# Patient Record
Sex: MALE | Race: OTHER | Employment: OTHER | URBAN - METROPOLITAN AREA
[De-identification: names, ages, dates, MRNs, and addresses within clinical notes are randomized per-mention and may not be internally consistent; named-entity substitution may affect disease eponyms.]

---

## 2021-05-12 ENCOUNTER — OFFICE VISIT (OUTPATIENT)
Dept: INTERNAL MEDICINE CLINIC | Facility: CLINIC | Age: 46
End: 2021-05-12
Payer: COMMERCIAL

## 2021-05-12 VITALS
TEMPERATURE: 99 F | DIASTOLIC BLOOD PRESSURE: 82 MMHG | HEIGHT: 69.5 IN | OXYGEN SATURATION: 98 % | HEART RATE: 73 BPM | BODY MASS INDEX: 29.3 KG/M2 | WEIGHT: 202.38 LBS | SYSTOLIC BLOOD PRESSURE: 130 MMHG

## 2021-05-12 DIAGNOSIS — R00.1 SINUS BRADYCARDIA: ICD-10-CM

## 2021-05-12 DIAGNOSIS — Z12.5 SCREENING PSA (PROSTATE SPECIFIC ANTIGEN): ICD-10-CM

## 2021-05-12 DIAGNOSIS — R79.89 LIVER FUNCTION TEST ABNORMALITY: ICD-10-CM

## 2021-05-12 DIAGNOSIS — R55 VASOVAGAL SYNCOPE: ICD-10-CM

## 2021-05-12 DIAGNOSIS — Z00.00 PHYSICAL EXAM, ANNUAL: Primary | ICD-10-CM

## 2021-05-12 DIAGNOSIS — I10 BENIGN HYPERTENSION: ICD-10-CM

## 2021-05-12 PROCEDURE — 99386 PREV VISIT NEW AGE 40-64: CPT | Performed by: INTERNAL MEDICINE

## 2021-05-12 PROCEDURE — 3079F DIAST BP 80-89 MM HG: CPT | Performed by: INTERNAL MEDICINE

## 2021-05-12 PROCEDURE — 3075F SYST BP GE 130 - 139MM HG: CPT | Performed by: INTERNAL MEDICINE

## 2021-05-12 PROCEDURE — 3008F BODY MASS INDEX DOCD: CPT | Performed by: INTERNAL MEDICINE

## 2021-05-12 NOTE — PROGRESS NOTES
Sarah Beth Morales is a 39year old male.     HPI:   Patient presents with:  Checkup: establish care      40 y/o M here to establish care; previous PCP Dr Jimbo Hernandez (now retired); has HTN that is treated with enalapril 20 mg daily, and hydrochlorothiazi loss; no fatigue  ENMT:  Negative for ear drainage, hearing loss and nasal drainage  Eyes:  Negative for eye discharge and vision loss  Cardiovascular:  Negative for chest pain; negative palpitations  Respiratory:  Negative for cough, dyspnea and wheezing [facility]iano in 2018; ceruloplasmin level was low at 18 in Sept 2018; liver US in Sept 2018 showed diffusely echogenic liver in appearance; ZAIDA titer 160; alpha 1- AT normal at 96.4; iron 77, iron sat 26%, ferritin 191; HCV neg; Hep B SAb was neg (non-i

## 2021-05-28 ENCOUNTER — LAB ENCOUNTER (OUTPATIENT)
Dept: LAB | Facility: HOSPITAL | Age: 46
End: 2021-05-28
Attending: INTERNAL MEDICINE
Payer: COMMERCIAL

## 2021-05-28 DIAGNOSIS — Z12.5 SCREENING PSA (PROSTATE SPECIFIC ANTIGEN): ICD-10-CM

## 2021-05-28 DIAGNOSIS — R79.89 LIVER FUNCTION TEST ABNORMALITY: ICD-10-CM

## 2021-05-28 PROCEDURE — 80061 LIPID PANEL: CPT | Performed by: INTERNAL MEDICINE

## 2021-05-28 PROCEDURE — 86706 HEP B SURFACE ANTIBODY: CPT

## 2021-05-28 PROCEDURE — 36415 COLL VENOUS BLD VENIPUNCTURE: CPT | Performed by: INTERNAL MEDICINE

## 2021-05-28 PROCEDURE — 84443 ASSAY THYROID STIM HORMONE: CPT | Performed by: INTERNAL MEDICINE

## 2021-05-28 PROCEDURE — 82390 ASSAY OF CERULOPLASMIN: CPT

## 2021-05-28 PROCEDURE — 82525 ASSAY OF COPPER: CPT

## 2021-05-28 PROCEDURE — 85025 COMPLETE CBC W/AUTO DIFF WBC: CPT | Performed by: INTERNAL MEDICINE

## 2021-05-28 PROCEDURE — 80053 COMPREHEN METABOLIC PANEL: CPT | Performed by: INTERNAL MEDICINE

## 2021-06-01 ENCOUNTER — TELEPHONE (OUTPATIENT)
Dept: INTERNAL MEDICINE CLINIC | Facility: CLINIC | Age: 46
End: 2021-06-01

## 2021-06-01 DIAGNOSIS — R79.89 LIVER FUNCTION TEST ABNORMALITY: Primary | ICD-10-CM

## 2021-06-01 DIAGNOSIS — E83.00 LOW CERULOPLASMIN LEVEL: ICD-10-CM

## 2021-06-01 NOTE — TELEPHONE ENCOUNTER
Low ceruloplasmin level 18.5  Hep B surface antibody level is nonreactive    Rec- 24-hour urine copper, R/O Rob's disease    Pt will pursue Hep B vaccination at [facility]    Pt called

## 2021-06-30 ENCOUNTER — LAB ENCOUNTER (OUTPATIENT)
Dept: LAB | Facility: HOSPITAL | Age: 46
End: 2021-06-30
Attending: INTERNAL MEDICINE
Payer: COMMERCIAL

## 2021-06-30 DIAGNOSIS — E83.00 LOW CERULOPLASMIN LEVEL: ICD-10-CM

## 2021-06-30 DIAGNOSIS — R79.89 LIVER FUNCTION TEST ABNORMALITY: ICD-10-CM

## 2021-06-30 PROCEDURE — 82525 ASSAY OF COPPER: CPT

## 2021-07-05 LAB
COPPER, URINE - UG/DL: <1 UG/DL
CREATININE, URINE - PER 24H: 2090 MG/D
CREATININE, URINE - PER VOLUME: 95 MG/DL
HOURS COLLECTED: 24 HR
TOTAL VOLUME: 2200 ML

## 2021-07-14 ENCOUNTER — TELEPHONE (OUTPATIENT)
Dept: INTERNAL MEDICINE CLINIC | Facility: CLINIC | Age: 46
End: 2021-07-14

## 2021-07-14 ENCOUNTER — PATIENT MESSAGE (OUTPATIENT)
Dept: INTERNAL MEDICINE CLINIC | Facility: CLINIC | Age: 46
End: 2021-07-14

## 2021-07-14 NOTE — TELEPHONE ENCOUNTER
----- Message from Elly Nascimento sent at 7/14/2021  4:20 PM CDT -----  Regarding: Test Results Question  Contact: [phone]  Hello,    I was just wondering if the \"COPPER, URINE\" test results were in normal range.      ThanksAbel

## 2021-07-14 NOTE — TELEPHONE ENCOUNTER
From: Mariangel Webb  To: Coreen Jackson MD  Sent: 7/14/2021 4:20 PM CDT  Subject: Test Results Question    Hello,    I was just wondering if the \"COPPER, URINE\" test results were in normal range.      Thanks,  Clive Lopez

## 2021-07-15 NOTE — TELEPHONE ENCOUNTER
Imp- LF abnormality   Hepatic steatosis    24-hour urine <1.0 (unmeasurable); is normal result    Rec- re-check LFTs in 6-12 mos; pt called; efforts on weight loss emphasized    Pt called

## 2021-12-08 ENCOUNTER — TELEPHONE (OUTPATIENT)
Dept: INTERNAL MEDICINE CLINIC | Facility: CLINIC | Age: 46
End: 2021-12-08

## 2021-12-08 ENCOUNTER — PATIENT MESSAGE (OUTPATIENT)
Dept: INTERNAL MEDICINE CLINIC | Facility: CLINIC | Age: 46
End: 2021-12-08

## 2021-12-08 NOTE — TELEPHONE ENCOUNTER
----- Message from Chip Moody sent at 12/8/2021 11:52 AM CST -----  Regarding: Persistent Cough  Lilolo Dr. Farnaz Chan,    I have had a persistent cough that has lasted about a week now.   Last night I had a fever of 102.5, however it seems to have been r

## 2021-12-08 NOTE — TELEPHONE ENCOUNTER
Respiratory infection triage:    Fever:  []  No fever  [x]  Fever>100.4 101  Cough:  [] Tight cough  [] Cough with exertion  [] Dry cough  [x] Sputum production, Color:     Breathing:  [] Mild shortness of breath interfering with activity  [x] Wheezing  []

## 2021-12-09 NOTE — TELEPHONE ENCOUNTER
From: Jasmin Barker  To: Michelle Munoz MD  Sent: 12/8/2021 11:52 AM CST  Subject: Persistent Cough    Hello Dr. José Luis Scott,    I have had a persistent cough that has lasted about a week now.  Last night I had a fever of 102.5, however it seems to have b

## 2021-12-09 NOTE — TELEPHONE ENCOUNTER
Spoke to pt for condition update----pt reports he did not go to UC last night, pt woke up today with no fever, no wheezing or crackling when breathing, denies SOB, reports general symptom improvement and resolvement.  Pt states he is feeling much better tod

## 2023-12-07 ENCOUNTER — LAB ENCOUNTER (OUTPATIENT)
Dept: LAB | Facility: HOSPITAL | Age: 48
End: 2023-12-07
Attending: INTERNAL MEDICINE
Payer: COMMERCIAL

## 2023-12-07 DIAGNOSIS — I10 ESSENTIAL HYPERTENSION WITH GOAL BLOOD PRESSURE LESS THAN 140/90: Primary | ICD-10-CM

## 2023-12-07 LAB
EST. AVERAGE GLUCOSE BLD GHB EST-MCNC: 117 MG/DL (ref 68–126)
HBA1C MFR BLD: 5.7 % (ref ?–5.7)

## 2023-12-07 PROCEDURE — 83036 HEMOGLOBIN GLYCOSYLATED A1C: CPT

## 2023-12-07 PROCEDURE — 36415 COLL VENOUS BLD VENIPUNCTURE: CPT

## 2025-02-27 ENCOUNTER — OFFICE VISIT (OUTPATIENT)
Dept: INTERNAL MEDICINE CLINIC | Facility: CLINIC | Age: 50
End: 2025-02-27
Payer: COMMERCIAL

## 2025-02-27 VITALS
DIASTOLIC BLOOD PRESSURE: 58 MMHG | BODY MASS INDEX: 31.84 KG/M2 | HEART RATE: 60 BPM | SYSTOLIC BLOOD PRESSURE: 116 MMHG | HEIGHT: 70.25 IN | WEIGHT: 222.38 LBS | OXYGEN SATURATION: 98 % | TEMPERATURE: 98 F

## 2025-02-27 DIAGNOSIS — Z12.5 SCREENING PSA (PROSTATE SPECIFIC ANTIGEN): ICD-10-CM

## 2025-02-27 DIAGNOSIS — E29.1 HYPOGONADISM MALE: ICD-10-CM

## 2025-02-27 DIAGNOSIS — E66.811 OBESITY (BMI 30.0-34.9): ICD-10-CM

## 2025-02-27 DIAGNOSIS — Z00.00 PHYSICAL EXAM, ANNUAL: Primary | ICD-10-CM

## 2025-02-27 DIAGNOSIS — R73.03 PREDIABETES: ICD-10-CM

## 2025-02-27 DIAGNOSIS — R79.89 LIVER FUNCTION TEST ABNORMALITY: ICD-10-CM

## 2025-02-27 DIAGNOSIS — I10 BENIGN HYPERTENSION: ICD-10-CM

## 2025-02-27 NOTE — PROGRESS NOTES
Humberto Arias is a 49 year old male.    HPI:     Chief Complaint   Patient presents with    Physical     Annual Px, discuss B/P medication from once to twice if needed       50 y/o M here for physical exam; BP stable on enalapril 20 mg daily, and hydrochlorothiazide 12.5 mg daily;  no CP; no SOB; no headaches; no palpitation        HISTORY:  Past Medical History:    Essential hypertension    Sinus bradycardia    Vasovagal syncope      History reviewed. No pertinent surgical history.   Family History   Problem Relation Age of Onset    Other (Other) Brother         celiac disease    Diabetes Maternal Grandmother       Social History:   Social History     Socioeconomic History    Marital status:    Tobacco Use    Smoking status: Never    Smokeless tobacco: Never   Vaping Use    Vaping status: Never Used   Substance and Sexual Activity    Alcohol use: Yes     Comment: socially     Drug use: No        Medications (Active prior to today's visit):  Current Outpatient Medications   Medication Sig Dispense Refill    HYDROCHLOROTHIAZIDE 12.5 MG Oral Cap TAKE 1 CAPSULE(12.5 MG) BY MOUTH EVERY MORNING 90 capsule 3    ENALAPRIL 20 MG Oral Tab TAKE 1 TABLET(20 MG) BY MOUTH DAILY 90 tablet 3       Allergies:  Allergies[1]            ROS:   Constitutional: no weight loss; no fatigue  ENMT:  Negative for ear drainage, hearing loss and nasal drainage  Eyes:  Negative for eye discharge and vision loss  Cardiovascular:  Negative for chest pain; negative palpitations  Respiratory:  Negative for cough, dyspnea and wheezing  Endocrine:  Negative for abnormal sleep patterns, increased activity, polydipsia and polyphagia  Gastrointestinal:  Negative for abdominal pain, constipation, decreased appetite, diarrhea and vomiting; no melena or hematochezia  Musculoskeletal:  Negative for arthralgias or myalgias  Genitourinary:  Negative for dysuria or polyuria  Hema/Lymph:  Negative for easy bleeding and easy bruising  Integumentary:   Negative for pruritus and rash  Neurological:  Negative for gait disturbance; negative for paresthesias   All other review of systems are negative.        PHYSICAL EXAM:   Blood pressure 116/58, pulse 60, temperature 98.2 °F (36.8 °C), height 5' 10.25\" (1.784 m), weight 222 lb 6.4 oz (100.9 kg), SpO2 98%.  Constitutional: alert and oriented x3 in no acute distress  HEENT- EOMI, PERRL  Nose/Mouth/Throat: pharynx without erythema; no oral lesions  Neck/Thyroid: neck supple; no thyromegaly  Lymphatics: no lymphadenopathy of neck or groin  Cardiovascular: RRR, S1, S2, no S3 or murmur  Respiratory: lungs without crackles or wheezes  Abdomen: normoactive bowel sounds, soft, non-tender and non-distended  Extremities: no clubbing, cyanosis or edema  Vascular: no carotid bruits; DP/PT 2/2  Musculoskeletal: Motor 5/5 upper and lower extremities  Neurological: cranial nerves II-XII intact; light touch and proprioception intact  Skin: no rash or ulcerations         ASSESSMENT/PLAN:   Physical exam  Advised colonoscopy--> exam deferred for now;      HTN  Dx'd in 2015; on enalapril 20 mg daily, and hydrochlorothiazide 12.5 mg daily; Has been followed by cardiologist, Dr Cronin (retired); establishing care with Dr Coreas later this year      LFT abnormality  AST 66.  in April 2018; work-up by GI Dr Rob Elizabeth in 2018; ceruloplasmin level was low at 18 in Sept 2018; liver US in Sept 2018 showed diffusely echogenic liver in appearance; ZAIDA titer 160; alpha 1- AT normal at 96.4; iron 77, iron sat 26%, ferritin 191; HCV neg; Hep B SAb was neg (non-immune); celiac Ab testing neg; AMA neg; anti- smooth muscle Ab neg; Fibroscan was done in 2018 (report not available for review); had Hep B series x 2--> check Ab  -ceruloplasmin mildly low at 18.5, copper normal at 78.6 ; 24-urine copper < 1.0 in 2021  -Hep B SAb non-reactive  -re-check CMP     History of vasovagal syncope  Positive tilt table test in 1990s; no episodes since  2011     Sinus bradycardia  Asymptomatic     Narcolepsy  Had been using Ritalin 10 mg po every day prn; not used since 2012     Obesity  Body mass index is 31.68 kg/m².     +FHx celiac disease  In brother; patient had neg TTG Ab and endomysial Ab in 2018    PSA screening   PSA 0.45 in May 2021    Prediabetes  A1C 5.7% in Dec 2023; check A1C    Hypogonadism  Check screening testosterone level           Cell [phone]    Spent 30 minutes obtaining history, evaluating patient, discussing treatment options, diet, exercise, review of available labs and radiology reports, and completing documentation.          Orders This Visit:  Orders Placed This Encounter   Procedures    CBC With Differential With Platelet    Comp Metabolic Panel (14)    PSA Total, Screen    Lipid Panel    TSH W Reflex To Free T4    Testosterone Total [E]    Hemoglobin A1C [E]       Meds This Visit:  Requested Prescriptions      No prescriptions requested or ordered in this encounter       Imaging & Referrals:  None     2/27/2025  Chao Lopez MD               [1] No Known Allergies

## 2025-07-11 ENCOUNTER — TELEPHONE (OUTPATIENT)
Dept: INTERNAL MEDICINE CLINIC | Facility: CLINIC | Age: 50
End: 2025-07-11

## 2025-07-11 NOTE — TELEPHONE ENCOUNTER
Called patient and left message notifying patient that 2/27/26 appointment with Dr. Lopez was canceled due to practice closing effective 6/30/25.     Provided patient with Dr. Margie eFlix's phone number to schedule an appointment and the physician referral department number.